# Patient Record
Sex: FEMALE | Race: WHITE | NOT HISPANIC OR LATINO | URBAN - METROPOLITAN AREA
[De-identification: names, ages, dates, MRNs, and addresses within clinical notes are randomized per-mention and may not be internally consistent; named-entity substitution may affect disease eponyms.]

---

## 2019-01-28 ENCOUNTER — IMPORTED ENCOUNTER (OUTPATIENT)
Dept: URBAN - METROPOLITAN AREA CLINIC 50 | Facility: CLINIC | Age: 74
End: 2019-01-28

## 2019-01-28 NOTE — PATIENT DISCUSSION
"""Monitor ERM every 6 months for changes. Informed patient of potential for worsening.  Instructed ""

## 2019-01-28 NOTE — PATIENT DISCUSSION
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  Recommended annual monitoring with Specular ""

## 2019-02-19 ENCOUNTER — IMPORTED ENCOUNTER (OUTPATIENT)
Dept: URBAN - METROPOLITAN AREA CLINIC 50 | Facility: CLINIC | Age: 74
End: 2019-02-19

## 2019-02-19 NOTE — PATIENT DISCUSSION
"""Yag Capsulotomy performed on left eye with signed consent."" ""Start Prednisolone Acetate left eye twice a day

## 2019-04-15 ENCOUNTER — IMPORTED ENCOUNTER (OUTPATIENT)
Dept: URBAN - METROPOLITAN AREA CLINIC 50 | Facility: CLINIC | Age: 74
End: 2019-04-15

## 2020-02-18 ENCOUNTER — IMPORTED ENCOUNTER (OUTPATIENT)
Dept: URBAN - METROPOLITAN AREA CLINIC 50 | Facility: CLINIC | Age: 75
End: 2020-02-18

## 2020-08-03 ENCOUNTER — IMPORTED ENCOUNTER (OUTPATIENT)
Dept: URBAN - METROPOLITAN AREA CLINIC 50 | Facility: CLINIC | Age: 75
End: 2020-08-03

## 2020-08-03 NOTE — PATIENT DISCUSSION
"""Continue Artificial tears both eyes as needed . "" ""Continue Restasis both eyes twice a day . """

## 2021-04-17 ASSESSMENT — VISUAL ACUITY
OD_BAT: 20/20
OS_SC: 20/60
OD_CC: J2@ 16 IN
OS_OTHER: 20/25. 20/60.
OD_SC: 20/30
OS_CC: J2@ 16 IN
OD_SC: 20/30-2
OS_BAT: 20/25
OS_SC: 20/80
OS_OTHER: 20/25. 20/60.
OS_CC: J2@ 14 IN
OD_OTHER: 20/20. 20/60.
OS_CC: J1@ 16 IN
OS_PH: 20/30-2
OS_BAT: 20/25
OD_PH: 20/20
OS_SC: 20/50
OD_SC: 20/30
OD_CC: J1@ 16 IN
OD_SC: 20/25
OS_PH: 20/20
OD_PH: 20/25
OS_SC: 20/60+2
OD_CC: J2@ 14 IN
OS_CC: J2
OD_PH: 20/20-2
OD_SC: 20/30-
OD_CC: J2
OS_SC: 20/60-2+2
OS_PH: 20/30+2

## 2021-04-17 ASSESSMENT — TONOMETRY
OS_IOP_MMHG: 12
OS_IOP_MMHG: 16
OS_IOP_MMHG: 16
OD_IOP_MMHG: 17
OD_IOP_MMHG: 16
OD_IOP_MMHG: 13
OS_IOP_MMHG: 16
OD_IOP_MMHG: 19
OS_IOP_MMHG: 16
OD_IOP_MMHG: 18

## 2021-07-29 ENCOUNTER — PREPPED CHART (OUTPATIENT)
Dept: URBAN - METROPOLITAN AREA CLINIC 50 | Facility: CLINIC | Age: 76
End: 2021-07-29

## 2021-07-29 NOTE — PATIENT DISCUSSION
"""Continue Artificial tears both eyes as needed . "" ""Continue Restasis both eyes twice a day . ""."

## 2021-08-02 ENCOUNTER — ANNUAL COMPREHENSIVE EXAM (OUTPATIENT)
Dept: URBAN - METROPOLITAN AREA CLINIC 50 | Facility: CLINIC | Age: 76
End: 2021-08-02

## 2021-08-02 DIAGNOSIS — H52.13: ICD-10-CM

## 2021-08-02 DIAGNOSIS — H35.372: ICD-10-CM

## 2021-08-02 DIAGNOSIS — H35.033: ICD-10-CM

## 2021-08-02 DIAGNOSIS — H43.813: ICD-10-CM

## 2021-08-02 DIAGNOSIS — H52.4: ICD-10-CM

## 2021-08-02 DIAGNOSIS — H16.223: ICD-10-CM

## 2021-08-02 PROCEDURE — 92015 DETERMINE REFRACTIVE STATE: CPT

## 2021-08-02 PROCEDURE — 92014 COMPRE OPH EXAM EST PT 1/>: CPT

## 2021-08-02 PROCEDURE — 92134 CPTRZ OPH DX IMG PST SGM RTA: CPT

## 2021-08-02 ASSESSMENT — VISUAL ACUITY
OU_CC: J1+@16IN
OD_CC: 20/25+1
OS_CC: 20/20-1
OU_CC: 20/20

## 2021-08-02 ASSESSMENT — KERATOMETRY
OD_K1POWER_DIOPTERS: 47.75
OS_K1POWER_DIOPTERS: 47.75
OD_AXISANGLE_DEGREES: 6
OD_K2POWER_DIOPTERS: 46.25
OS_AXISANGLE_DEGREES: 162
OS_AXISANGLE2_DEGREES: 072
OS_K2POWER_DIOPTERS: 46.25
OD_AXISANGLE2_DEGREES: 096

## 2021-08-02 ASSESSMENT — TONOMETRY
OD_IOP_MMHG: 14
OS_IOP_MMHG: 14

## 2021-08-02 NOTE — PATIENT DISCUSSION
Mild hypertensive retinopathy OU. Discussed with patient the signs and associated risks of potentially permanent damage to ocular structures due to systemic hypertension. Stressed the importance of maintaining a healthy, smoke-free life style. Encouraged patient to continue monitoring blood pressure at home, as well as maintaining compliance with prescribed hypertension medications. Patient instructed to continue systemic care with PCP regarding hypertensive status. A copy of today’s ocular examination will be provided to patient’s PCP as desired. Recommend comprehensive dilated exams anually, or sooner if needed.

## 2022-08-01 ENCOUNTER — ESTABLISHED PATIENT (OUTPATIENT)
Dept: URBAN - METROPOLITAN AREA CLINIC 50 | Facility: CLINIC | Age: 77
End: 2022-08-01

## 2022-08-01 DIAGNOSIS — H52.203: ICD-10-CM

## 2022-08-01 DIAGNOSIS — H43.813: ICD-10-CM

## 2022-08-01 DIAGNOSIS — H16.223: ICD-10-CM

## 2022-08-01 DIAGNOSIS — H35.033: ICD-10-CM

## 2022-08-01 DIAGNOSIS — H35.372: ICD-10-CM

## 2022-08-01 PROCEDURE — 92015 DETERMINE REFRACTIVE STATE: CPT

## 2022-08-01 PROCEDURE — 92014 COMPRE OPH EXAM EST PT 1/>: CPT

## 2022-08-01 PROCEDURE — 92134 CPTRZ OPH DX IMG PST SGM RTA: CPT

## 2022-08-01 ASSESSMENT — VISUAL ACUITY
OS_CC: 20/25
OD_CC: 20/25
OU_CC: J5@14"
OU_CC: 20/20

## 2022-08-01 ASSESSMENT — TONOMETRY
OD_IOP_MMHG: 15
OS_IOP_MMHG: 14

## 2023-08-08 ENCOUNTER — COMPREHENSIVE EXAM (OUTPATIENT)
Dept: URBAN - METROPOLITAN AREA CLINIC 52 | Facility: CLINIC | Age: 78
End: 2023-08-08

## 2023-08-08 DIAGNOSIS — H43.813: ICD-10-CM

## 2023-08-08 DIAGNOSIS — H35.372: ICD-10-CM

## 2023-08-08 DIAGNOSIS — H35.033: ICD-10-CM

## 2023-08-08 DIAGNOSIS — H18.513: ICD-10-CM

## 2023-08-08 PROCEDURE — 92015 DETERMINE REFRACTIVE STATE: CPT

## 2023-08-08 PROCEDURE — 92134 CPTRZ OPH DX IMG PST SGM RTA: CPT

## 2023-08-08 PROCEDURE — 92014 COMPRE OPH EXAM EST PT 1/>: CPT

## 2023-08-08 RX ORDER — SODIUM CHLORIDE 50 MG/ML
1 SOLUTION OPHTHALMIC TWICE A DAY
Start: 2023-08-08

## 2023-08-08 ASSESSMENT — VISUAL ACUITY
OU_CC: 20/20-1
OU_CC: J1+
OD_CC: 20/25+2
OS_CC: 20/20-2

## 2023-08-08 ASSESSMENT — TONOMETRY
OD_IOP_MMHG: 14
OS_IOP_MMHG: 14

## 2024-08-20 ENCOUNTER — COMPREHENSIVE EXAM (OUTPATIENT)
Dept: URBAN - METROPOLITAN AREA CLINIC 49 | Facility: LOCATION | Age: 79
End: 2024-08-20

## 2024-08-20 DIAGNOSIS — H35.373: ICD-10-CM

## 2024-08-20 DIAGNOSIS — H43.813: ICD-10-CM

## 2024-08-20 DIAGNOSIS — H52.4: ICD-10-CM

## 2024-08-20 DIAGNOSIS — H16.223: ICD-10-CM

## 2024-08-20 DIAGNOSIS — H18.513: ICD-10-CM

## 2024-08-20 DIAGNOSIS — H52.203: ICD-10-CM

## 2024-08-20 PROCEDURE — 99214 OFFICE O/P EST MOD 30 MIN: CPT

## 2024-08-20 PROCEDURE — 92134 CPTRZ OPH DX IMG PST SGM RTA: CPT

## 2024-08-20 PROCEDURE — 92015 DETERMINE REFRACTIVE STATE: CPT

## 2024-08-20 ASSESSMENT — VISUAL ACUITY
OU_CC: J2
OD_CC: 20/25
OS_CC: 20/25

## 2024-08-20 ASSESSMENT — TONOMETRY
OS_IOP_MMHG: 16
OD_IOP_MMHG: 16

## 2025-04-28 ENCOUNTER — FOLLOW UP (OUTPATIENT)
Age: 80
End: 2025-04-28

## 2025-04-28 DIAGNOSIS — H16.223: ICD-10-CM

## 2025-04-28 PROCEDURE — 99212 OFFICE O/P EST SF 10 MIN: CPT

## 2025-04-28 RX ORDER — LOTEPREDNOL ETABONATE 5 MG/ML: 1 SUSPENSION/ DROPS OPHTHALMIC TWICE A DAY

## 2025-08-21 ENCOUNTER — COMPREHENSIVE EXAM (OUTPATIENT)
Age: 80
End: 2025-08-21

## 2025-08-21 DIAGNOSIS — H35.373: ICD-10-CM

## 2025-08-21 DIAGNOSIS — H43.813: ICD-10-CM

## 2025-08-21 DIAGNOSIS — H16.223: ICD-10-CM

## 2025-08-21 DIAGNOSIS — H35.033: ICD-10-CM

## 2025-08-21 DIAGNOSIS — H18.513: ICD-10-CM

## 2025-08-21 PROCEDURE — 99214 OFFICE O/P EST MOD 30 MIN: CPT

## 2025-08-21 PROCEDURE — 92134 CPTRZ OPH DX IMG PST SGM RTA: CPT
